# Patient Record
Sex: MALE
[De-identification: names, ages, dates, MRNs, and addresses within clinical notes are randomized per-mention and may not be internally consistent; named-entity substitution may affect disease eponyms.]

---

## 2018-11-05 ENCOUNTER — HOSPITAL ENCOUNTER (OUTPATIENT)
Dept: HOSPITAL 97 - 4TH | Age: 48
Setting detail: OBSERVATION
LOS: 1 days | Discharge: HOME | End: 2018-11-06
Attending: FAMILY MEDICINE | Admitting: FAMILY MEDICINE
Payer: COMMERCIAL

## 2018-11-05 VITALS — BODY MASS INDEX: 35.2 KG/M2

## 2018-11-05 DIAGNOSIS — N20.0: Primary | ICD-10-CM

## 2018-11-05 DIAGNOSIS — G40.909: ICD-10-CM

## 2018-11-05 DIAGNOSIS — R03.0: ICD-10-CM

## 2018-11-05 DIAGNOSIS — Z87.442: ICD-10-CM

## 2018-11-05 LAB
ALBUMIN SERPL BCP-MCNC: 3.9 G/DL (ref 3.4–5)
ALP SERPL-CCNC: 73 U/L (ref 45–117)
ALT SERPL W P-5'-P-CCNC: 30 U/L (ref 12–78)
AST SERPL W P-5'-P-CCNC: 13 U/L (ref 15–37)
BUN BLD-MCNC: 19 MG/DL (ref 7–18)
GLUCOSE SERPLBLD-MCNC: 109 MG/DL (ref 74–106)
HCT VFR BLD CALC: 43.4 % (ref 39.6–49)
INR BLD: 1.07
LYMPHOCYTES # SPEC AUTO: 1.6 K/UL (ref 0.7–4.9)
MAGNESIUM SERPL-MCNC: 2.1 MG/DL (ref 1.8–2.4)
MCH RBC QN AUTO: 29.1 PG (ref 27–35)
MCV RBC: 84.6 FL (ref 80–100)
PMV BLD: 9.7 FL (ref 7.6–11.3)
POTASSIUM SERPL-SCNC: 4 MMOL/L (ref 3.5–5.1)
RBC # BLD: 5.13 M/UL (ref 4.33–5.43)
UA COMPLETE W REFLEX CULTURE PNL UR: (no result)
UA DIPSTICK W REFLEX MICRO PNL UR: (no result)

## 2018-11-05 PROCEDURE — 85025 COMPLETE CBC W/AUTO DIFF WBC: CPT

## 2018-11-05 PROCEDURE — 81001 URINALYSIS AUTO W/SCOPE: CPT

## 2018-11-05 PROCEDURE — 36415 COLL VENOUS BLD VENIPUNCTURE: CPT

## 2018-11-05 PROCEDURE — 81003 URINALYSIS AUTO W/O SCOPE: CPT

## 2018-11-05 PROCEDURE — 85610 PROTHROMBIN TIME: CPT

## 2018-11-05 PROCEDURE — 85730 THROMBOPLASTIN TIME PARTIAL: CPT

## 2018-11-05 PROCEDURE — 81015 MICROSCOPIC EXAM OF URINE: CPT

## 2018-11-05 PROCEDURE — 83735 ASSAY OF MAGNESIUM: CPT

## 2018-11-05 PROCEDURE — 50590 FRAGMENTING OF KIDNEY STONE: CPT

## 2018-11-05 PROCEDURE — 80053 COMPREHEN METABOLIC PANEL: CPT

## 2018-11-05 RX ADMIN — Medication SCH ML: at 19:59

## 2018-11-05 RX ADMIN — MORPHINE SULFATE PRN MG: 2 INJECTION, SOLUTION INTRAMUSCULAR; INTRAVENOUS at 18:19

## 2018-11-05 RX ADMIN — DEXTROSE, SODIUM CHLORIDE, SODIUM LACTATE, POTASSIUM CHLORIDE, AND CALCIUM CHLORIDE SCH MLS: 5; .6; .31; .03; .02 INJECTION, SOLUTION INTRAVENOUS at 18:18

## 2018-11-05 RX ADMIN — HYDROCODONE BITARTRATE AND ACETAMINOPHEN PRN TAB: 7.5; 325 TABLET ORAL at 19:58

## 2018-11-05 RX ADMIN — ONDANSETRON PRN MG: 2 INJECTION INTRAMUSCULAR; INTRAVENOUS at 20:26

## 2018-11-05 NOTE — XMS REPORT
Clinical Summary

 Created on:2018



Patient:Valdo Santos

Sex:Male

:1970

External Reference #:[id number]





Demographics







 Address  [address] [place] [address]

 

 Mobile Phone  [phone]

 

 Home Phone  [phone]

 

 Email Address  [email or web address]

 

 Preferred Language  English

 

 Marital Status  

 

 Restoration Affiliation  Unknown

 

 Race  Unknown

 

 Ethnic Group  Not  or 









Author







 Organization  [facility]

 

 Address  [address] [place] [address]









Support







 Name  Relationship  Address  Phone

 

 No,Contact  Unavailable  Unavailable  [phone]









Care Team Providers







 Name  Role  Phone

 

 Asked, No Pcp  Primary Care Provider  Unavailable









Allergies

No Known Allergies



Current Medications







 Prescription  Sig.  Disp.  Refills  Start Date  End Date  Status

 

 topiramate  TAKE 1 TABLET  270 tablet  1  2018    Active



 (TOPAMAX) 100 MG  BY MOUTH          



 tablet  EVERY MORNING          



   AND TAKE 2          



   TABLETS BY          



   MOUTH EVERY          



   NIGHT AT          



   BEDTIME          

 

 topiramate  Take 3  270 tablet  1  10/20/2017  2017  Discontinued



 (TOPAMAX) 100 MG  tablets (300          



 tablet  mg total) by          



   mouth daily          



   for 180 days.          



   100 mg po qam          



   and 200 mg po          



   qhs.          

 

 topiramate  Take 3  270 tablet  0  2018  Discontinued



 (TOPAMAX) 100 MG  tablets (300          



 tablet  mg total) by          



   mouth daily          



   for 90 days.          



   100 mg po qam          



   and 200 mg po          



   qhs          

 

 topiramate  Take 3  270 tablet  0  2018  Discontinued



 (TOPAMAX) 100 MG  tablets (300          



 tablet  mg total) by          



   mouth daily          



   for 90 days.          



   100 mg po qam          



   and 200 mg po          



   qhs          







Active Problems

Not on file



Encounters







 Date  Type  Specialty  Care Team  Description

 

 2018  Refill  Neurology  Pérez Martinez MD  

 

 2018  Office Visit  Neurology  Pérez Martinez MD  Convulsions, unspecified 
convulsion



         type (Primary Dx)

 

 2017  Refill  Neurology  Pérez Martinez MD  



after 2017



Family History







 Medical History  Relation  Name  Comments

 

 Diabetes  Father    

 

 No Known Problems  Mother    









 Relation  Name  Status  Comments

 

 Father      

 

 Mother      







Social History







 Tobacco Use  Types  Packs/Day  Years Used  Date

 

 Never Smoker        









 Smokeless Tobacco: Never Used      









 Alcohol Use  Drinks/Week  oz/Week  Comments

 

 No      









 Sex Assigned at Birth  Date Recorded

 

 Not on file  







Last Filed Vital Signs







 Vital Sign  Reading  Time Taken

 

 Blood Pressure  142/74  2018  3:44 PM CDT

 

 Pulse  76  2018  3:44 PM CDT

 

 Temperature  -  -

 

 Respiratory Rate  -  -

 

 Oxygen Saturation  -  -

 

 Inhaled Oxygen Concentration  -  -

 

 Weight  121 kg (267 lb 4.8 oz)  2018  3:44 PM CDT

 

 Height  177.8 cm (5' 10")  2018  3:44 PM CDT

 

 Body Mass Index  38.35  2018  3:44 PM CDT







Plan of Treatment







 Date  Type  Specialty  Care Team  Description

 

 2019  Office Visit  Neurology  Pérez Martinez MD



  



       [address] [facility] [address]



  



       [place] [address] [phone] (Fax)  









 Health Maintenance  Due Date  Last Done  Comments

 

 INFLUENZA VACCINE  2018    







Results

Not on fileafter 2017



Insurance







 Payer  Benefit Plan / Group  Subscriber ID  Type  Phone  Address

 

 BCBS  BCBS CHOICE PPO/FEDERAL EMPL PPO  xxxxxxxxxxxx  PPO    









 Guarantor Name  Account Type  Relation to  Date of  Phone  Billing



     Patient  Birth    Address

 

 SANTOSVALDO BYRNES  Personal/Family  Self  1970  Home:  [facility]







         [phone]  [place] [phone]

## 2018-11-05 NOTE — P.HP
Certification for Inpatient


Patient admitted to: Observation


With expected LOS: <2 Midnights


Patient will require the following post-hospital care: None


Practitioner: I am a practitioner with admitting privileges, knowledge of 

patient current condition, hospital course, and medical plan of care.


Services: Services provided to patient in accordance with Admission 

requirements found in Title 42 Section 412.3 of the Code of Federal Regulations





Patient History


Date of Service: 11/05/18


Primary Care Provider: Mony Holden; Urology-Dr. Petersen; Neurology-Dr. Mata


Reason for admission: Left flank pain


History of Present Illness: 





48-year-old  male presented to the hospital as a direct admission.





Patient presents with left flank pain. It started last night.  Pain comes in 

waves.  He rates the pain 10/10 at times.  Currently around 6/10.  He reports 

some nausea and no vomiting.  Denies any significant fever.  No hematuria 

noted. Patient was seen earlier in the day by urology.  Urology ordered x-ray 

showing left renal stone.  Urology recommended the patient to be admitted for 

intervention tomorrow.





Patient with history of kidney stones, seizure disorder and borderline 

hypertension.  He has had lithotripsy done in the past.  Patient denies any 

smoking or alcohol.





When I saw the patient in the room he reporting pain. He appears stable.  

Patient does not  have any respiratory distress.


Allergies





phenytoin sodium [From Dilantin] Allergy (Intermediate, Verified 11/30/12 06:28)


 Rash


phenytoin sodium extended [From Dilantin] Allergy (Intermediate, Verified 11/30/ 12 06:28)


 Rash





Home medications list reviewed: Yes





- Past Medical/Surgical History


Diabetic: No


-: Seizure disorder


-: Borderline hypertension


-: Nephrolithiasis


-: Lithotripsy


-: Back surgery


Psychosocial/ Personal History: The patient is .  He has 1 child.  He is 

a .





- Family History


  ** Father


-: Diabetes





- Social History


Smoking Status: Never smoker


Alcohol use: No


CD- Drugs: No


Caffeine use: Yes


Place of Residence: Home





Review of Systems


General: As per HPI


Eyes: Unremarkable


ENT: Unremarkable


Respiratory: Unremarkable


Cardiovascular: Unremarkable


Gastrointestinal: Nausea, Abdominal Pain, As per HPI


Genitourinary: As per HPI


Musculoskeletal: Back Pain, As per HPI


Integumentary: Unremarkable


Neurological: Unremarkable


Lymphatics: Unremarkable





Physical Examination





- Physical Exam


General: Alert, In no apparent distress, Oriented x3, Cooperative


HEENT: Atraumatic, Normocephalic, PERRLA, Mucous membr. moist/pink


Neck: Supple, No Thyromegaly


Respiratory: Clear to auscultation bilaterally, Normal air movement


Cardiovascular: Normal pulses, Regular rate/rhythm


Gastrointestinal: Normal bowel sounds, Soft and benign, Non-distended, No masses

, No rebound, No guarding, Tenderness (Left flank pain noted)


Musculoskeletal: No contractures, No erythema, No tenderness, No warmth


Integumentary: No tenderness/swelling, No erythema, No warmth, No cyanosis


Neurological: Normal speech, Normal strength at 5/5 x4 extr, Normal tone, 

Normal affect





Assessment and Plan





- Plan





Impression:


Left flank pain, nausea secondary to left nephrolithiasis with history of 

nephrolithiasis


Seizure disorder


History of borderline hypertension





Plan:


Left flank pain, nausea secondary to left nephrolithiasis with history of 

nephrolithiasis:  Patient was a direct admit from neurology.  Patient will be 

provided medication for pain. Will obtain CBC, BMP and urinalysis.  Will start 

Flomax.  Patient will remain NPO after midnight.  Cystoscopy with possible 

further urological intervention planned for tomorrow.  Await further 

recommendations from urology.


Seizure disorder:  Will resume home medication of Topamax 100 mg every a.m. and 

200 mg every bedtime.  Patient is seen by neurology in [place].


History of borderline hypertension:  Patient reports history of hypertension.  

Will monitor this closely.  Will provide medication as needed for elevated 

blood pressure


Discharge Plan: Home


Plan to discharge in: 24 Hours





- Advance Directives


Does patient have a Living Will: No


Does patient have a Durable POA for Healthcare: No





- Code Status/Comfort Care


Code Status Assessed: Yes (Patient is full code.)


Time Spent Managing Pts Care (In Minutes): 55

## 2018-11-06 VITALS — TEMPERATURE: 97.1 F | SYSTOLIC BLOOD PRESSURE: 131 MMHG | DIASTOLIC BLOOD PRESSURE: 73 MMHG

## 2018-11-06 VITALS — OXYGEN SATURATION: 97 %

## 2018-11-06 PROCEDURE — 0TF4XZZ FRAGMENTATION IN LEFT KIDNEY PELVIS, EXTERNAL APPROACH: ICD-10-PCS

## 2018-11-06 RX ADMIN — HYDROCODONE BITARTRATE AND ACETAMINOPHEN PRN TAB: 7.5; 325 TABLET ORAL at 04:28

## 2018-11-06 RX ADMIN — ONDANSETRON PRN MG: 2 INJECTION INTRAMUSCULAR; INTRAVENOUS at 06:02

## 2018-11-06 RX ADMIN — DEXTROSE, SODIUM CHLORIDE, SODIUM LACTATE, POTASSIUM CHLORIDE, AND CALCIUM CHLORIDE SCH MLS: 5; .6; .31; .03; .02 INJECTION, SOLUTION INTRAVENOUS at 04:29

## 2018-11-06 RX ADMIN — Medication SCH ML: at 08:07

## 2018-11-06 RX ADMIN — MORPHINE SULFATE PRN MG: 2 INJECTION, SOLUTION INTRAMUSCULAR; INTRAVENOUS at 00:16

## 2018-11-06 RX ADMIN — MORPHINE SULFATE PRN MG: 2 INJECTION, SOLUTION INTRAMUSCULAR; INTRAVENOUS at 05:58

## 2018-11-06 NOTE — P.DS
Admission Date: 11/05/18


Discharge Date: 11/06/18


Primary Care Provider: Mony Holden; Urology-Dr. Petersen; Neurology-Dr. Mata


Disposition: ROUTINE DISCHARGE


Discharge Condition: GOOD


Reason for Admission: Left flank pain


Consultations: 





Urology-Dr. Petersen


Procedures: 





KUB:  Calcification to the left pole





Urology:


Cystoscopy, ureteroscopy, extra corporal shockwave lithotripsy





Medical problem list:


Left flank pain, nausea secondary to left nephrolithiasis with renal 

insufficiency with history of nephrolithiasis status post cystoscopy, 

ureteroscopy, extra corporal shockwave lithotripsy


Seizure disorder


Hypertension





Brief History of Present Illness: 





48-year-old  male presented to the hospital as a direct admission.





Patient presents with left flank pain. It started last night.  Pain comes in 

waves.  He rates the pain 10/10 at times.  Currently around 6/10.  He reports 

some nausea and no vomiting.  Denies any significant fever.  No hematuria 

noted. Patient was seen earlier in the day by urology.  Urology ordered x-ray 

showing left renal stone.  Urology recommended the patient to be admitted for 

intervention tomorrow.





Patient with history of kidney stones, seizure disorder and borderline 

hypertension.  He has had lithotripsy done in the past.  Patient denies any 

smoking or alcohol.





When I saw the patient in the room he reporting pain. He appears stable.  

Patient does not  have any respiratory distress.


Hospital Course: 





Patient presented with left flank pain, nausea secondary to left 

nephrolithiasis with renal insufficiency.  Patient with history of 

nephrolithiasis.  Patient seen by Urology.  Cystoscopy, ureteroscopy, extra 

corporal shock wave lithotripsy done.  Patient tolerated procedure well.  At 

discharge patient will follow up with urology in 1 week to follow up this 

hospitalization.  Patient given medication by urology for pain. At discharge, 

he will continue with Flomax 0.4 mg daily. Recommendation to recheck lab-BMP in 

1 week to monitor his progress.





Patient has seizure disorder.  Patient will resume his home medication of 

Topamax 100 mg every a.m. and 200 mg every bedtime.  Patient will follow up 

with neurology as directed.





Patient with hypertension.  Recommendation to maintain blood pressures less 150/

80.  Further adjustment can be done by his PCP.  Patient may continue with his 

medication-Norvasc 5 mg daily.


Vital Signs/Physical Exam: 














Temp Pulse Resp BP Pulse Ox


 


 97.3 F   65   18   138/83   97 


 


 11/06/18 12:00  11/06/18 12:00  11/06/18 12:00  11/06/18 12:00  11/06/18 12:00








General: Alert, In no apparent distress, Oriented x3, Cooperative


HEENT: Atraumatic


Neck: Supple


Respiratory: Clear to auscultation bilaterally, Normal air movement


Cardiovascular: Normal pulses, Regular rate/rhythm


Gastrointestinal: Normal bowel sounds, Soft and benign, Non-distended, No 

tenderness, No masses, No rebound, No guarding


Musculoskeletal: No erythema, No tenderness, No warmth


Integumentary: No tenderness/swelling, No erythema, No warmth, No cyanosis


Neurological: Normal speech, Normal strength at 5/5 x4 extr, Normal tone, 

Normal affect


Laboratory Data at Discharge: 














WBC  12.0 K/uL (4.3-10.9)  H  11/05/18  17:58    


 


Hgb  14.9 g/dL (13.6-17.9)   11/05/18  17:58    


 


Hct  43.4 % (39.6-49.0)   11/05/18  17:58    


 


Plt Count  153 K/uL (152-406)   11/05/18  17:58    


 


PT  12.6 SECONDS (9.5-12.5)  H  11/05/18  17:58    


 


INR  1.07   11/05/18  17:58    


 


APTT  32.0 SECONDS (24.3-36.9)   11/05/18  17:58    


 


Sodium  142 mmol/L (136-145)   11/05/18  17:58    


 


Potassium  4.0 mmol/L (3.5-5.1)   11/05/18  17:58    


 


BUN  19 mg/dL (7-18)  H  11/05/18  17:58    


 


Creatinine  1.70 mg/dL (0.55-1.3)  H  11/05/18  17:58    


 


Glucose  109 mg/dL ()  H  11/05/18  17:58    


 


Magnesium  2.1 mg/dL (1.8-2.4)   11/05/18  17:58    


 


Total Bilirubin  0.4 mg/dL (0.2-1.0)   11/05/18  17:58    


 


AST  13 U/L (15-37)  L  11/05/18  17:58    


 


ALT  30 U/L (12-78)   11/05/18  17:58    


 


Alkaline Phosphatase  73 U/L ()   11/05/18  17:58    








Home Medications: 








Amlodipine Besylate 5 mg PO DAILY 11/05/18 


Topiramate 100 mg PO DAILY 11/05/18 


Topiramate 200 mg PO BEDTIME 11/05/18 


Tamsulosin [Flomax*] 0.4 mg PO BEDTIME #30 cap 11/06/18 





New Medications: 


Tamsulosin [Flomax*] 0.4 mg PO BEDTIME #30 cap


Patient Discharge Instructions: 1.  Patient will need to follow up with his PCP 

in 1 week to follow up this hospitalization.  2.  Patient presented with left 

flank pain, nausea secondary to left nephrolithiasis with renal insufficiency.  

Patient with history of nephrolithiasis.  Patient seen by Urology.  Cystoscopy, 

ureteroscopy, extra corporal shock wave lithotripsy done.  Patient tolerated 

procedure well.  At discharge patient will follow up with urology in 1 week to 

follow up this hospitalization.  Patient given medication by urology for pain. 

At discharge, he will continue with Flomax 0.4 mg daily. Recommendation to 

recheck lab-BMP in 1 week to monitor his progress.  3.  Patient has seizure 

disorder.  Patient will resume his home medication of Topamax 100 mg every a.m. 

and 200 mg every bedtime.  Patient will follow up with neurology as directed.  

4.  Patient with hypertension.  Recommendation to maintain blood pressures less 

150/80.  Further adjustment can be done by his PCP.  Patient may continue with 

his medication-Norvasc 5 mg daily.


Diet: AHA


Activity: Ad lorenzo


Followup: 


Noam Petersen MD [ACTIVE - CAN ADMIT] - 


Time spent managing pt's care (in minutes): 55

## 2018-11-09 ENCOUNTER — HOSPITAL ENCOUNTER (EMERGENCY)
Dept: HOSPITAL 97 - ER | Age: 48
Discharge: HOME | End: 2018-11-09
Payer: COMMERCIAL

## 2018-11-09 VITALS — SYSTOLIC BLOOD PRESSURE: 144 MMHG | OXYGEN SATURATION: 100 % | DIASTOLIC BLOOD PRESSURE: 90 MMHG

## 2018-11-09 VITALS — TEMPERATURE: 97.6 F

## 2018-11-09 DIAGNOSIS — Z88.8: ICD-10-CM

## 2018-11-09 DIAGNOSIS — I10: ICD-10-CM

## 2018-11-09 DIAGNOSIS — M79.602: Primary | ICD-10-CM

## 2018-11-09 DIAGNOSIS — G40.909: ICD-10-CM

## 2018-11-09 LAB
ALBUMIN SERPL BCP-MCNC: 4.1 G/DL (ref 3.4–5)
ALP SERPL-CCNC: 70 U/L (ref 45–117)
ALT SERPL W P-5'-P-CCNC: 30 U/L (ref 12–78)
AST SERPL W P-5'-P-CCNC: 13 U/L (ref 15–37)
BUN BLD-MCNC: 14 MG/DL (ref 7–18)
GLUCOSE SERPLBLD-MCNC: 88 MG/DL (ref 74–106)
HCT VFR BLD CALC: 47.7 % (ref 39.6–49)
INR BLD: 1.03
LYMPHOCYTES # SPEC AUTO: 2.3 K/UL (ref 0.7–4.9)
MAGNESIUM SERPL-MCNC: 2 MG/DL (ref 1.8–2.4)
MCH RBC QN AUTO: 28.8 PG (ref 27–35)
MCV RBC: 86.7 FL (ref 80–100)
NT-PROBNP SERPL-MCNC: 103 PG/ML (ref ?–125)
PMV BLD: 9.5 FL (ref 7.6–11.3)
POTASSIUM SERPL-SCNC: 3.6 MMOL/L (ref 3.5–5.1)
RBC # BLD: 5.5 M/UL (ref 4.33–5.43)
TROPONIN (EMERG DEPT USE ONLY): < 0.02 NG/ML (ref 0–0.04)

## 2018-11-09 PROCEDURE — 80048 BASIC METABOLIC PNL TOTAL CA: CPT

## 2018-11-09 PROCEDURE — 93005 ELECTROCARDIOGRAM TRACING: CPT

## 2018-11-09 PROCEDURE — 80076 HEPATIC FUNCTION PANEL: CPT

## 2018-11-09 PROCEDURE — 83735 ASSAY OF MAGNESIUM: CPT

## 2018-11-09 PROCEDURE — 83880 ASSAY OF NATRIURETIC PEPTIDE: CPT

## 2018-11-09 PROCEDURE — 36415 COLL VENOUS BLD VENIPUNCTURE: CPT

## 2018-11-09 PROCEDURE — 99284 EMERGENCY DEPT VISIT MOD MDM: CPT

## 2018-11-09 PROCEDURE — 93971 EXTREMITY STUDY: CPT

## 2018-11-09 PROCEDURE — 85610 PROTHROMBIN TIME: CPT

## 2018-11-09 PROCEDURE — 84484 ASSAY OF TROPONIN QUANT: CPT

## 2018-11-09 PROCEDURE — 71045 X-RAY EXAM CHEST 1 VIEW: CPT

## 2018-11-09 PROCEDURE — 85025 COMPLETE CBC W/AUTO DIFF WBC: CPT

## 2018-11-09 NOTE — RAD REPORT
EXAM DESCRIPTION:  RAD - Chest Single View - 11/9/2018 5:41 pm

 

CLINICAL HISTORY:  Left arm pain

 

COMPARISON:  November 5

 

TECHNIQUE:  AP portable chest image was obtained 1729 hours .

 

FINDINGS:  Lungs are clear. Heart and vasculature are normal. No measurable pleural effusion and no p
neumothorax. No acute bony abnormality seen. No acute aortic findings suspected.

 

IMPRESSION:  No acute cardiopulmonary process.

 

No significant interval change.

## 2018-11-09 NOTE — XMS REPORT
Clinical Summary

 Created on:2018



Patient:Valdo Santos

Sex:Male

:1970

External Reference #:[id number]





Demographics







 Address  [address] [place] [address]

 

 Mobile Phone  [phone]

 

 Home Phone  [phone]

 

 Email Address  [email or web address]

 

 Preferred Language  English

 

 Marital Status  

 

 Spiritism Affiliation  Unknown

 

 Race  Unknown

 

 Ethnic Group  Not  or 









Author







 Organization  [facility]

 

 Address  [address] [place] [address]









Support







 Name  Relationship  Address  Phone

 

 No,Contact  Unavailable  Unavailable  [phone]









Care Team Providers







 Name  Role  Phone

 

 Asked, No Pcp  Primary Care Provider  Unavailable









Allergies

No Known Allergies



Current Medications







 Prescription  Sig.  Disp.  Refills  Start Date  End Date  Status

 

 topiramate  TAKE 1 TABLET  270 tablet  1  2018    Active



 (TOPAMAX) 100 MG  BY MOUTH          



 tablet  EVERY MORNING          



   AND TAKE 2          



   TABLETS BY          



   MOUTH EVERY          



   NIGHT AT          



   BEDTIME          

 

 topiramate  Take 3  270 tablet  1  10/20/2017  2017  Discontinued



 (TOPAMAX) 100 MG  tablets (300          



 tablet  mg total) by          



   mouth daily          



   for 180 days.          



   100 mg po qam          



   and 200 mg po          



   qhs.          

 

 topiramate  Take 3  270 tablet  0  2018  Discontinued



 (TOPAMAX) 100 MG  tablets (300          



 tablet  mg total) by          



   mouth daily          



   for 90 days.          



   100 mg po qam          



   and 200 mg po          



   qhs          

 

 topiramate  Take 3  270 tablet  0  2018  Discontinued



 (TOPAMAX) 100 MG  tablets (300          



 tablet  mg total) by          



   mouth daily          



   for 90 days.          



   100 mg po qam          



   and 200 mg po          



   qhs          







Active Problems

Not on file



Encounters







 Date  Type  Specialty  Care Team  Description

 

 2018  Refill  Neurology  Pérez Martinez MD  

 

 2018  Office Visit  Neurology  Pérez Martinez MD  Convulsions, unspecified 
convulsion



         type (Primary Dx)

 

 2017  Refill  Neurology  Pérez Martinez MD  



after 2017



Family History







 Medical History  Relation  Name  Comments

 

 Diabetes  Father    

 

 No Known Problems  Mother    









 Relation  Name  Status  Comments

 

 Father      

 

 Mother      







Social History







 Tobacco Use  Types  Packs/Day  Years Used  Date

 

 Never Smoker        









 Smokeless Tobacco: Never Used      









 Alcohol Use  Drinks/Week  oz/Week  Comments

 

 No      









 Sex Assigned at Birth  Date Recorded

 

 Not on file  







Last Filed Vital Signs







 Vital Sign  Reading  Time Taken

 

 Blood Pressure  142/74  2018  3:44 PM CDT

 

 Pulse  76  2018  3:44 PM CDT

 

 Temperature  -  -

 

 Respiratory Rate  -  -

 

 Oxygen Saturation  -  -

 

 Inhaled Oxygen Concentration  -  -

 

 Weight  121 kg (267 lb 4.8 oz)  2018  3:44 PM CDT

 

 Height  177.8 cm (5' 10")  2018  3:44 PM CDT

 

 Body Mass Index  38.35  2018  3:44 PM CDT







Plan of Treatment







 Date  Type  Specialty  Care Team  Description

 

 2019  Office Visit  Neurology  Pérez Martinez MD



  



       [address] [facility] [address]



  



       [place] [address] [phone] (Fax)  









 Health Maintenance  Due Date  Last Done  Comments

 

 INFLUENZA VACCINE  2018    







Results

Not on fileafter 2017



Insurance







 Payer  Benefit Plan / Group  Subscriber ID  Type  Phone  Address

 

 BCBS  BCBS CHOICE PPO/FEDERAL EMPL PPO  xxxxxxxxxxxx  PPO    









 Guarantor Name  Account Type  Relation to  Date of  Phone  Billing



     Patient  Birth    Address

 

 SANTOSVALDO BYRNES  Personal/Family  Self  1970  Home:  [facility]







         [phone]  [place] [phone]

## 2018-11-09 NOTE — ER
Nurse's Notes                                                                                     

 [facility]                                                                

Name: Zhen Santos                                                                               

Age: 48 yrs                                                                                       

Sex: Male                                                                                         

: 1970                                                                                   

MRN: [id number]                                                                                   

Arrival Date: 2018                                                                          

Time: 14:09                                                                                       

Account#: [id number]                                                                            

Bed 23                                                                                            

Private MD: None, None                                                                            

Diagnosis: Pain in left arm                                                                       

                                                                                                  

Presentation:                                                                                     

                                                                                             

14:25 Presenting complaint: Patient states: Left arm pain that started Wednesday night.       aj  

      Patient had lithotripsy on Tuesday and reports IV site infiltration to left forearm.        

      Patient reports pain at side. Denies N/V SOB or diaphoresis. Transition of care:            

      patient was not received from another setting of care. Onset of symptoms was 2018. Risk Assessment: Do you want to hurt yourself or someone else? Patient            

      reports no desire to harm self or others. Initial Sepsis Screen: Does the patient meet      

      any 2 criteria? No. Patient's initial sepsis screen is negative. Does the patient have      

      a suspected source of infection? No. Patient's initial sepsis screen is negative. Care      

      prior to arrival: None.                                                                     

14:25 Method Of Arrival: Ambulatory                                                             

14:25 Acuity: THO 3                                                                             

                                                                                                  

Triage Assessment:                                                                                

14:29 General: Appears in no apparent distress. comfortable, Behavior is calm, cooperative,   aj  

      appropriate for age. Pain: Complains of pain in left arm. Neuro: Level of Consciousness     

      is awake, alert, obeys commands, Oriented to person, place, time, situation,                

      Appropriate for age. Cardiovascular: Denies diaphoresis, fatigue, lightheadedness,          

      nausea, palpitations, shortness of breath, syncope, vomiting. Respiratory: Airway is        

      patent Respiratory effort is even, unlabored, Respiratory pattern is regular,               

      symmetrical. Derm: Skin is intact, is healthy with good turgor, Skin is pink, warm \T\      

      dry. normal, Bruising that is brown, on dorsal aspect of left forearm. Musculoskeletal:     

      Reports pain in left arm.                                                                   

                                                                                                  

Historical:                                                                                       

- Allergies:                                                                                      

14:29 Dilantin;                                                                               aj  

- Home Meds:                                                                                      

14:29 Topamax Oral [Active]; alfuzosin 10 mg oral Tb24 1 tab once daily [Active]; amlodipine  aj  

      oral [Active];                                                                              

- PMHx:                                                                                           

14:29 Hypertension; Seizures; Prostate Issues; Kidney stones;                                 aj  

- PSHx:                                                                                           

14:29 Lithotripsy;                                                                            aj  

                                                                                                  

- Immunization history:: Adult Immunizations up to date.                                          

- Social history:: Smoking status: Patient/guardian denies using tobacco.                         

- Ebola Screening: : Patient negative for fever greater than or equal to 101.5 degrees            

  Fahrenheit, and additional compatible Ebola Virus Disease symptoms Patient denies               

  exposure to infectious person Patient denies travel to an Ebola-affected area in the            

  21 days before illness onset No symptoms or risks identified at this time.                      

                                                                                                  

                                                                                                  

Screenin:00 Abuse screen: Denies threats or abuse. Nutritional screening: No deficits noted.        tl3 

      Tuberculosis screening: No symptoms or risk factors identified. Fall Risk None              

      identified.                                                                                 

                                                                                                  

Assessment:                                                                                       

14:45 General: Appears uncomfortable, well groomed, well developed, well nourished. Pain:     tl3 

      Complains of pain in dorsal aspect of left forearm and left arm. Neuro: Level of            

      Consciousness is awake, alert, obeys commands, Oriented to person, place, time,             

      situation, Appropriate for age. Cardiovascular: Patient's skin is warm and dry.             

      Respiratory: Airway is patent Respiratory effort is even, unlabored, Respiratory            

      pattern is regular, symmetrical. GI: No signs and/or symptoms were reported involving       

      the gastrointestinal system. : No signs and/or symptoms were reported regarding the       

      genitourinary system. EENT: No signs and/or symptoms were reported regarding the EENT       

      system. Derm: No signs and/or symptoms reported regarding the dermatologic system.          

17:00 Reassessment: No changes from previously documented assessment. Patient and/or family   tl3 

      updated on plan of care and expected duration. Pain level reassessed. Patient is alert,     

      oriented x 3, equal unlabored respirations, skin warm/dry/pink.                             

                                                                                                  

Vital Signs:                                                                                      

14:29  / 89; Pulse 74; Resp 20; Temp 97.6; Pulse Ox 99% on R/A; Weight 114.76 kg;       aj  

      Height 5 ft. 10 in. (177.80 cm);                                                            

14:45  / 85; Pulse 53; Resp 18; Pulse Ox 97% ;                                          tl3 

17:00  / 90; Pulse 53; Resp 18; Pulse Ox 100% ;                                         tl3 

14:29 Body Mass Index 36.30 (114.76 kg, 177.80 cm)                                            aj  

                                                                                                  

ED Course:                                                                                        

14:09 Patient arrived in ED.                                                                  mr  

14:09 None, None is Private Physician.                                                        mr  

14:27 Triage completed.                                                                       aj  

14:29 Arm band placed on right wrist. Patient placed in waiting room, Patient notified of       

      wait time.                                                                                  

15:10 EKG done, by EKG tech. reviewed by Ray Eduardo MD.                                   3 

16:53 Ray Turcios PA is PHCP.                                                                cp  

16:53 Ray Eduardo MD is Attending Physician.                                             cp  

17:00 Patient has correct armband on for positive identification.                             tl3 

17:00 No provider procedures requiring assistance completed.                                  tl3 

17:00 IV discontinued, intact, bleeding controlled, No redness/swelling at site. Pressure     tl3 

      dressing applied.                                                                           

17:25 Kianna Gandara, RN is Primary Nurse.                                                     tl3 

17:41 XRAY Chest (1 view) In Process Unspecified.                                             EDMS

18:00 US Extremity Venous Unilateral Ltd Sent.                                                tl3 

18:18 Initial lab(s) drawn, by me, sent to lab. Inserted saline lock: 20 gauge in right       dh3 

      antecubital area, using aseptic technique. Blood collected.                                 

18:31 Ultrasound completed. Patient tolerated well.                                           sg3 

18:49 US Extremity Venous Unilateral Ltd In Process Unspecified.                              EDMS

                                                                                                  

Administered Medications:                                                                         

17:30 Drug: Aspirin Chewable Tablet 324 mg Route: PO;                                         tl3 

19:56 Follow up: Response: No adverse reaction                                                tl3 

                                                                                                  

                                                                                                  

Outcome:                                                                                          

17:00 Discharged to home ambulatory.                                                          tl3 

17:00 Condition: stable                                                                           

17:00 Discharge instructions given to patient, Instructed on discharge instructions, follow       

      up and referral plans. Demonstrated understanding of instructions, follow-up care,          

      medications, Prescriptions given X 2.                                                       

19:22 Discharge ordered by MD.                                                                cp  

19:58 Patient left the ED.                                                                    tl3 

                                                                                                  

Signatures:                                                                                       

Dispatcher [employer]                           EDMS                                                 

Leslee Javier, RN                       [employer] Steele                                 mr                                                   

Ray Turcios PA PA cp Herrera, Deanna                              3                                                  

Radha Jones                               3                                                  

Kianna Gandara, RN                       RN   3                                                  

Augustina Osuna                              3                                                  

                                                                                                  

**************************************************************************************************

## 2018-11-09 NOTE — EKG
Test Date:    2018-11-09               Test Time:    14:41:07

Technician:   DOROTHY                                     

                                                     

MEASUREMENT RESULTS:                                       

Intervals:                                           

Rate:         58                                     

KY:           158                                    

QRSD:         98                                     

QT:           434                                    

QTc:          426                                    

Axis:                                                

P:            45                                     

KY:           158                                    

QRS:          44                                     

T:            45                                     

                                                     

INTERPRETIVE STATEMENTS:                                       

                                                     

Sinus bradycardia

Otherwise normal ECG

No previous ECG available for comparison



Electronically Signed On 11-09-18 18:44:45 CST by Isaiah Moya

## 2018-11-09 NOTE — EDPHYS
Physician Documentation                                                                           

 [facility]                                                                

Name: Zhen Santos                                                                               

Age: 48 yrs                                                                                       

Sex: Male                                                                                         

: 1970                                                                                   

MRN: [id number]                                                                                   

Arrival Date: 2018                                                                          

Time: 14:09                                                                                       

Account#: [id number]                                                                            

Bed 23                                                                                            

Private MD: None, None                                                                            

ED Physician Ray Eduardo                                                                      

HPI:                                                                                              

                                                                                             

17:10 This 48 yrs old  Male presents to ER via Ambulatory with complaints of Arm      cp  

      Pain, Shoulder Pain.                                                                        

17:10 The patient or guardian complains of pain, that is acute. The complaints affect the     cp  

      left arm. Onset: The symptoms/episode began/occurred 2 day(s) ago.                          

17:10 Context: Patient reports having IV to placed in left arm 2 days ago while in hospital   cp  

      for kidney stone. Pain to left arm started after discharge from hospital. Patient           

      denies injury.                                                                              

                                                                                                  

Historical:                                                                                       

- Allergies:                                                                                      

14:29 Dilantin;                                                                               aj  

- Home Meds:                                                                                      

14:29 Topamax Oral [Active]; alfuzosin 10 mg oral Tb24 1 tab once daily [Active]; amlodipine  aj  

      oral [Active];                                                                              

- PMHx:                                                                                           

14:29 Hypertension; Seizures; Prostate Issues; Kidney stones;                                 aj  

- PSHx:                                                                                           

14:29 Lithotripsy;                                                                            aj  

                                                                                                  

- Immunization history:: Adult Immunizations up to date.                                          

- Social history:: Smoking status: Patient/guardian denies using tobacco.                         

- Ebola Screening: : Patient negative for fever greater than or equal to 101.5 degrees            

  Fahrenheit, and additional compatible Ebola Virus Disease symptoms Patient denies               

  exposure to infectious person Patient denies travel to an Ebola-affected area in the            

  21 days before illness onset No symptoms or risks identified at this time.                      

                                                                                                  

                                                                                                  

ROS:                                                                                              

17:13 Constitutional: Negative for body aches, chills, poor PO intake.                        cp  

17:13 Eyes: Negative for injury, pain, redness, and discharge.                                cp  

17:13 ENT: Negative for drainage from ear(s), ear pain, sore throat, difficulty swallowing,       

      difficulty handling secretions.                                                             

17:13 Cardiovascular: Positive for left upper chest pain, Negative for edema, palpitations.       

17:13 Respiratory: Negative for cough, shortness of breath, wheezing.                             

17:13 Abdomen/GI: Negative for abdominal pain, nausea, vomiting, and diarrhea, constipation,      

      anorexia, black/tarry stool, rectal bleeding.                                               

17:13 Back: Negative for pain at rest, pain with movement, radiated pain.                         

17:13 : Negative for urinary symptoms, flank pain.                                              

17:13 MS/extremity: Positive for pain, tenderness, of the left arm, Negative for injury or        

      acute deformity, decreased range of motion, paresthesias, tingling, warmth.                 

17:13 Skin: Negative for cellulitis, diaphoresis, rash.                                           

17:13 Neuro: Negative for altered mental status, dizziness, headache, syncope, near syncope,      

      weakness.                                                                                   

17:13 All other systems are negative.                                                             

                                                                                                  

Exam:                                                                                             

17:15 ECG was reviewed by the Attending Physician.                                            cp  

17:20 Constitutional: The patient appears in no acute distress, alert, awake,                 cp  

      non-diaphoretic, non-toxic, well developed, well nourished.                                 

17:20 Head/Face:  Normocephalic, atraumatic. Eyes:  Pupils equal round and reactive to light, cp  

      extra-ocular motions intact.  Lids and lashes normal.  Conjunctiva and sclera are           

      non-icteric and not injected.  Cornea within normal limits.  Periorbital areas with no      

      swelling, redness, or edema. ENT:  Nares patent. No nasal discharge, no septal              

      abnormalities noted.  Tympanic membranes are normal and external auditory canals are        

      clear.  Oropharynx with no redness, swelling, or masses, exudates, or evidence of           

      obstruction, uvula midline.  Mucous membranes moist. Neck:  Trachea midline, no             

      thyromegaly or masses palpated, and no cervical lymphadenopathy.  Supple, full range of     

      motion without nuchal rigidity, or vertebral point tenderness.  No Meningismus.             

      Chest/axilla:  Normal chest wall appearance and motion.  Nontender with no deformity.       

      No lesions are appreciated.                                                                 

17:20 Cardiovascular: Rate: bradycardic, Rhythm: regular, Pulses: Pulses are 2+ in right          

      radial artery and left radial artery. Heart sounds: murmur, not appreciated, rub, not       

      appreciated, gallop, not appreciated, Edema: is not appreciated, JVD: is not                

      appreciated.                                                                                

17:20 Respiratory: the patient does not display signs of respiratory distress,  Respirations:     

      normal, no use of accessory muscles, no retractions, no splinting, no tachypnea,            

      labored breathing, is not present, Breath sounds: are clear throughout, no decreased        

      breath sounds, no stridor, no wheezing.                                                     

17:20 Abdomen/GI: Inspection: abdomen appears normal, Bowel sounds: active, all quadrants,        

      Palpation: abdomen is soft and non-tender, in all quadrants, rebound tenderness, is not     

      appreciated, involuntary guarding, is not appreciated.                                      

17:20 Back: pain, is absent, ROM is normal.                                                       

17:20 Musculoskeletal/extremity: Extremities: grossly normal except: noted in the volar side      

      left proximal forearm: tenderness, There is no evidence of  ecchymosis, erythema, rash,     

      ROM: full active range of motion, in the left arm, Perfusion: the extremity is normally     

      perfused throughout, Sensation intact.                                                      

17:20 Skin: cellulitis, is not appreciated, no rash present.                                      

17:20 Neuro: Orientation: to person, place \T\ time. Mentation: is normal, Cerebellar function:   

      is grossly normal, Motor: moves all fours, strength is normal.                              

                                                                                                  

Vital Signs:                                                                                      

14:29  / 89; Pulse 74; Resp 20; Temp 97.6; Pulse Ox 99% on R/A; Weight 114.76 kg;       aj  

      Height 5 ft. 10 in. (177.80 cm);                                                            

14:45  / 85; Pulse 53; Resp 18; Pulse Ox 97% ;                                          tl3 

17:00  / 90; Pulse 53; Resp 18; Pulse Ox 100% ;                                         tl3 

14:29 Body Mass Index 36.30 (114.76 kg, 177.80 cm)                                            aj  

                                                                                                  

MDM:                                                                                              

16:53 Patient medically screened.                                                             cp  

18:00 Differential diagnosis: cellulitis, DVT, angina, strain, tendonitis, phlebitis.         cp  

19:20 Data reviewed: vital signs, nurses notes, lab test result(s), EKG, radiologic studies,  cp  

      plain films.                                                                                

19:20 Test interpretation: by ED physician or midlevel provider: ECG, plain radiologic        cp  

      studies. Counseling: I had a detailed discussion with the patient and/or guardian           

      regarding: the historical points, exam findings, and any diagnostic results supporting      

      the discharge/admit diagnosis, lab results, radiology results, to return to the             

      emergency department if symptoms worsen or persist or if there are any questions or         

      concerns that arise at home. Response to treatment: the patient's symptoms have             

      markedly improved after treatment, VSS. EKG, labs, and radiology studies negative for       

      acute findings. Will discharge to home for continued monitoring.                            

                                                                                                  

                                                                                             

17:07 Order name: Basic Metabolic Panel; Complete Time: 19:16                                 cp  

                                                                                             

19:17 Interpretation: Normal except: ; GFR 80.                                            

                                                                                             

17:07 Order name: CBC with Diff; Complete Time: 19:16                                           

                                                                                             

19:17 Interpretation: Normal except: WBC 8.4; RBC 5.50.                                         

                                                                                             

17:07 Order name: LFT's; Complete Time: 19:16                                                 cp  

                                                                                             

19:17 Interpretation: Normal except: AST 13; GLOB 3.7.                                          

                                                                                             

17:07 Order name: Magnesium; Complete Time: 19:16                                             cp  

                                                                                             

17:07 Order name: NT PRO-BNP; Complete Time: 19:16                                            cp  

                                                                                             

19:17 Interpretation: Within normal limits: NT PRO-.                                     

                                                                                             

17:07 Order name: PT-INR; Complete Time: 19:16                                                  

                                                                                             

17:07 Order name: Troponin (emerg Dept Use Only); Complete Time: 19:16                          

                                                                                             

19:17 Interpretation: TROPED < 0.02; Reviewed.                                                  

                                                                                             

17:07 Order name: XRAY Chest (1 view)                                                           

                                                                                             

17:07 Order name: EKG; Complete Time: 17:08                                                     

                                                                                             

17:07 Order name: Cardiac monitoring; Complete Time: 17:53                                      

                                                                                             

17:07 Order name: EKG - Nurse/Tech; Complete Time: 17:53                                        

                                                                                             

17:07 Order name: IV Saline Lock; Complete Time: 17:53                                        cp  

                                                                                             

17:07 Order name: US Extremity Venous Unilateral Ltd                                            

                                                                                             

17:07 Order name: Labs collected and sent; Complete Time: 17:53                                 

                                                                                             

17:07 Order name: O2 Per Protocol; Complete Time: 17:26                                         

                                                                                             

17:07 Order name: O2 Sat Monitoring; Complete Time: 17:26                                     cp  

                                                                                                  

EC:15 Rate is 58 beats/min. Rhythm is regular. MT interval is normal. QRS interval is normal. cp  

      QT interval is normal. Interpreted by me. Reviewed by me.                                   

                                                                                                  

Administered Medications:                                                                         

17:30 Drug: Aspirin Chewable Tablet 324 mg Route: PO;                                         tl3 

19:56 Follow up: Response: No adverse reaction                                                tl3 

                                                                                                  

                                                                                                  

Disposition:                                                                                      

18 19:22 Discharged to Home. Impression: Pain in left arm.                                  

- Condition is Stable.                                                                            

- Discharge Instructions: Musculoskeletal Pain, Phlebitis, Easy-to-Read.                          

- Prescriptions for Anaprox  mg Oral Tablet - take 1 tablet by ORAL route every             

  12 hours As needed; 20 tablet. Cyclobenzaprine 10 mg Oral Tablet - take 1 tablet by             

  ORAL route every 8 hours As needed; 20 tablet.                                                  

- Medication Reconciliation Form, Thank You Letter, Antibiotic Education, Prescription            

  Opioid Use form.                                                                                

- Follow up: Private Physician; When: 2 - 3 days; Reason: Recheck today's complaints.             

- Problem is new.                                                                                 

- Symptoms have improved.                                                                         

                                                                                                  

                                                                                                  

                                                                                                  

Addendum:                                                                                         

2018                                                                                        

     09:38 Co-signature as Attending Physician, Ray Eduardo MD I agree with the assessment and  c
ha

           plan of care.                                                                          

                                                                                                  

Signatures:                                                                                       

Dispatcher [employer]                           EDLeslee Simpson, RN                       RN   Ray Chavira MD MD cha Page, Corey, PA PA cp Lowrey, Tammy, RN                       RN   tl3                                                  

                                                                                                  

Corrections: (The following items were deleted from the chart)                                    

                                                                                             

19:58 19:22 2018 19:22 Discharged to Home. Impression: Pain in left arm. Condition is   tl3 

      Stable. Forms are Medication Reconciliation Form, Thank You Letter, Antibiotic              

      Education, Prescription Opioid Use. Follow up: Private Physician; When: 2 - 3 days;         

      Reason: Recheck today's complaints. Problem is new. Symptoms have improved. cp              

                                                                                                  

**************************************************************************************************

## 2023-09-19 NOTE — RAD REPORT
EXAM DESCRIPTION:  US - Extremity Venous Uni Ltd - 11/9/2018 6:47 pm

 

CLINICAL HISTORY:  Left arm pain and swelling

 

COMPARISON:  None.

 

TECHNIQUE:  Real-time sonographic evaluation of the left upper extremity deep venous systems was perf
ormed.

 

FINDINGS:  Normal compressibility, flow augmentation, phasic flow and spontaneous flow are identified
 in the left upper extremity deep venous system. No intraluminal filling defects seen. Internal jugul
ar and subclavian veins are normal as well.

 

IMPRESSION:  No DVT in the left upper extremity. Withheld/Decline to Answer

## 2025-02-23 ENCOUNTER — HOSPITAL ENCOUNTER (EMERGENCY)
Dept: HOSPITAL 97 - ER | Age: 55
Discharge: HOME | End: 2025-02-23
Payer: COMMERCIAL

## 2025-02-23 VITALS — TEMPERATURE: 98.1 F | OXYGEN SATURATION: 96 % | SYSTOLIC BLOOD PRESSURE: 160 MMHG | DIASTOLIC BLOOD PRESSURE: 91 MMHG

## 2025-02-23 DIAGNOSIS — N13.2: Primary | ICD-10-CM

## 2025-02-23 DIAGNOSIS — Z87.442: ICD-10-CM

## 2025-02-23 LAB
ALBUMIN SERPL BCP-MCNC: 3.6 G/DL (ref 3.4–5)
ALBUMIN/GLOB SERPL: 0.9 {RATIO} (ref 1.1–1.8)
ALP SERPL-CCNC: 83 U/L (ref 45–117)
ALT SERPL W P-5'-P-CCNC: 28 U/L (ref 16–61)
ANION GAP SERPL CALC-SCNC: 10.4 MEQ/L (ref 5–15)
AST SERPL W P-5'-P-CCNC: 13 U/L (ref 15–37)
BLD SMEAR INTERP: (no result)
BUN BLD-MCNC: 20 MG/DL (ref 7–18)
GLOBULIN SER CALC-MCNC: 3.9 G/DL (ref 2.3–3.5)
GLUCOSE SERPLBLD-MCNC: 140 MG/DL (ref 74–106)
HCT VFR BLD CALC: 49.2 % (ref 39.6–49)
HGB BLD-MCNC: 15.9 G/DL (ref 13.6–17.9)
LIPASE SERPL-CCNC: 40 U/L (ref 13–75)
LYMPHOCYTES # SPEC AUTO: 1.2 K/UL (ref 0.7–4.9)
MCH RBC QN AUTO: 27.6 PG (ref 27–35)
MCHC RBC AUTO-ENTMCNC: 32.3 G/DL (ref 32–36)
MCV RBC: 85.3 FL (ref 80–100)
MORPHOLOGY BLD-IMP: (no result)
NRBC # BLD: 0 10*3/UL (ref 0–0)
NRBC BLD AUTO-RTO: 0.2 % (ref 0–0)
PMV BLD: 9.1 FL (ref 7.6–11.3)
POTASSIUM SERPL-SCNC: 3.4 MEQ/L (ref 3.5–5.1)
RBC # BLD: 5.77 M/UL (ref 4.33–5.43)
WBC # BLD AUTO: 12.9 THOU/UL (ref 4.3–10.9)

## 2025-02-23 PROCEDURE — 80053 COMPREHEN METABOLIC PANEL: CPT

## 2025-02-23 PROCEDURE — 74176 CT ABD & PELVIS W/O CONTRAST: CPT

## 2025-02-23 PROCEDURE — 96374 THER/PROPH/DIAG INJ IV PUSH: CPT

## 2025-02-23 PROCEDURE — 83690 ASSAY OF LIPASE: CPT

## 2025-02-23 PROCEDURE — 99284 EMERGENCY DEPT VISIT MOD MDM: CPT

## 2025-02-23 PROCEDURE — 76377 3D RENDER W/INTRP POSTPROCES: CPT

## 2025-02-23 PROCEDURE — 96361 HYDRATE IV INFUSION ADD-ON: CPT

## 2025-02-23 PROCEDURE — 96375 TX/PRO/DX INJ NEW DRUG ADDON: CPT

## 2025-02-23 PROCEDURE — 36415 COLL VENOUS BLD VENIPUNCTURE: CPT

## 2025-02-23 PROCEDURE — 85025 COMPLETE CBC W/AUTO DIFF WBC: CPT

## 2025-02-23 NOTE — ER
Nurse's Notes                                                                                     

 [facility]                                                                 

Name: Zhen Santos                                                                               

Age: 54 yrs                                                                                       

Sex: Male                                                                                         

: 1970                                                                                   

MRN: [id number]                                                                                   

Arrival Date: 2025                                                                          

Time: 06:59                                                                                       

Account#: [id number]                                                                            

Bed 8                                                                                             

Private MD:                                                                                       

Diagnosis: Hydronephrosis with renal and ureteral calculous obstruction-5 mm distal right         

                                                                                                  

Presentation:                                                                                     

                                                                                             

07:11 Chief complaint: Patient states: Left flank pain. Coronavirus screen: At this time, the [facility] 

      client does not indicate any symptoms associated with coronavirus-19. Ebola Screen: No      

      symptoms or risks identified at this time. Initial Sepsis Screen: Does the patient meet     

      any 2 criteria? No. Patient's initial sepsis screen is negative. Does the patient have      

      a suspected source of infection? No. Patient's initial sepsis screen is negative. Risk      

      Assessment: Do you want to hurt yourself or someone else? Patient reports no desire to      

      harm self or others. Onset of symptoms was 2025 at 02:00.                      

07:11 Method Of Arrival: Ambulatory                                                           [facility] 

07:11 Acuity: THO 3                                                                           jl7 

                                                                                                  

Triage Assessment:                                                                                

07:15 General: Appears in no apparent distress. uncomfortable, Behavior is calm, cooperative, jl7 

      appropriate for age. Pain: Complains of pain in left flank Pain currently is 5 out of       

      10 on a pain scale. at worst was 8 out of 10 on a pain scale. Cardiovascular: Patient's     

      skin is warm and dry. Respiratory: Airway is patent Respiratory effort is even,             

      unlabored, Respiratory pattern is regular, symmetrical. GI: Abdomen is non-distended.       

      Derm: Skin is pink, warm \T\ dry.                                                           

                                                                                                  

Historical:                                                                                       

- Allergies:                                                                                      

07:13 Dilantin;                                                                               jl7 

- PMHx:                                                                                           

07:13 Hypertension; Kidney stones; prostate issues; Seizures;                                 jl7 

                                                                                                  

- Immunization history:: Adult Immunizations unknown.                                             

- Infectious Disease History:: Denies.                                                            

- Social history:: Smoking status: Patient denies any tobacco usage or history of.                

                                                                                                  

                                                                                                  

Screenin:15 [facility] ED Fall Risk Assessment (Adult) History of falling in the last 3 months,       jl7 

      including since admission No falls in past 3 months (0 pts) Confusion or Disorientation     

      No (0 pts) Intoxicated or Sedated No (0 pts) Impaired Gait No (0 pts) Mobility Assist       

      Device Used No (0 pt) Altered Elimination No (0 pt) Score/Fall Risk Level 0 - 2 = Low       

      Risk Oriented to surroundings, Maintained a safe environment. Abuse screen: Denies          

      threats or abuse. Denies injuries from another. Nutritional screening: No deficits          

      noted. Tuberculosis screening: No symptoms or risk factors identified.                      

                                                                                                  

Assessment:                                                                                       

08:00 Reassessment: Patient appears in no apparent distress at this time. Patient and/or      jl7 

      family updated on plan of care and expected duration. Pain level reassessed. Patient is     

      alert, oriented x 3, equal unlabored respirations, skin warm/dry/pink.                      

                                                                                                  

Vital Signs:                                                                                      

07:11  / 91; Pulse 77; Resp 15; Temp 98.1; Pulse Ox 96% ; Weight 123.83 kg; Height 6    jl7 

      ft. 0 in. ; Pain 8/10;                                                                      

07:45 Pain 3/10;                                                                              jl7 

09:00  / 83; Pulse 67; Resp 17; Pulse Ox 96% ; Pain 3/10;                               jl7 

07:11 Body Mass Index 37.03 (123.83 kg, 182.88 cm)                                            jl7 

07:11 Pain Scale: Adult                                                                       jl7 

07:45 Pain Scale: Adult                                                                       jl7 

09:00 Pain Scale: Adult                                                                       jl7 

                                                                                                  

ED Course:                                                                                        

07:01 Patient arrived in ED.                                                                  mr  

07:03 Ray Eduardo MD is Attending Physician.                                             radha 

07:06 Salvatore Valencia, RN is Primary Nurse.                                                      jl7 

07:13 Triage completed.                                                                       jl7 

07:15 Arm band placed on right wrist.                                                         jl7 

07:15 Patient has correct armband on for positive identification. Provided Education on: use  jl7 

      of call bell.                                                                               

07:15 Initial lab(s) drawn, by me, sent to lab. Urine collected: clean catch specimen, clear. jl7 

      Inserted saline lock: 20 gauge in right antecubital area, using aseptic technique.          

      Blood collected. Flushed with 10 mL NS.                                                     

07:49 CT Stone Protocol In Process Unspecified.                                               EDMS

08:24 Peter Bone MD is Referral Physician.                                              radha 

09:05 No provider procedures requiring assistance completed. IV discontinued, intact,         jl7 

      bleeding controlled, No redness/swelling at site. Pressure dressing applied.                

                                                                                                  

Administered Medications:                                                                         

07:17 Drug: Ondansetron IVP 4 mg IVP once; over 2 minutes Route: IVP; Site: right antecubital;ld1 

08:59 Follow up: Response: No adverse reaction                                                jl7 

07:17 Drug: NS 0.9% IV 1000 ml IV at 1 bolus Per protocol; to be given as a bolus over 60     ld1 

      minutes Route: IV; Rate: 1 bolus; Site: right antecubital;                                  

08:59 Follow up: Response: No adverse reaction; IV Status: Completed infusion; IV Intake:     jl7 

      1000ml                                                                                      

07:18 Drug: Ketorolac IVP 30 mg IVP once Route: IVP; Site: right antecubital;                 ld1 

07:45 Follow up: Pain 3/10 Adult; Response: No adverse reaction; Pain is decreased; RASS:     jl7 

      Alert and Calm (0)                                                                          

08:50 Drug: Rocephin IV 1 grams IV at per protocol once; Given slow IV push per pharmacy      jl7 

      instructions Route: IV; Rate: per protocol; Site: right antecubital;                        

09:00 Follow up: Response: No adverse reaction; IV Status: Completed infusion                 jl7 

09:04 Not Given (Physician Discretion): morphineor iv 4 mg IVP once over 4 mins               jl7 

09:04 Not Given (Physician Discretion): ns 0.9% 1000 ml IV at 1000 ml once; to be given as a  jl7 

      bolus over 60 minutes                                                                       

                                                                                                  

                                                                                                  

Medication:                                                                                       

08:00 VIS not applicable for this client.                                                     jl7 

                                                                                                  

Intake:                                                                                           

08:59 IV: 1000ml; Total: 1000ml.                                                              jl7 

                                                                                                  

Outcome:                                                                                          

08:24 Discharge ordered by MD.                                                                radha 

09:06 Discharged to home ambulatory,                                                          jl 

09:06 Condition: stable                                                                           

09:06 Discharge instructions given to patient, Instructed on discharge instructions, follow       

      up and referral plans. medication usage, Demonstrated understanding of instructions,        

      follow-up care, medications, Prescriptions given X 4,                                       

09:06 Patient left the ED.                                                                    jl7 

                                                                                                  

Signatures:                                                                                       

Dispatcher [employer]                           EDMS                                                 

Ray Eduardo MD MD cha Rivera, Mary, Reg                       Reg  Salvatore Aparicio RN                        RN   jl7                                                  

Etta Paz RN                        RN   ld1                                                  

                                                                                                  

**************************************************************************************************

## 2025-02-23 NOTE — RAD REPORT
EXAMINATION: Stone Protocol



CLINICAL INDICATION: Abdominal pain. Left flank pain



TECHNIQUE: CT abdomen and pelvis was performed, without IV contrast, as per department protocol. Oral
 contrast not given. Axial, sagittal and coronal reconstructions were obtained. One or more of the

following dose reduction techniques were used: Automated exposure control, adjustment of the mA and k
V according to the patient size, and iterative reconstruction. Unless otherwise specified,

incidental findings do not require dedicated imaging follow-up. 



COMPARISON: 2014.



FINDINGS:  



The lack of intravenous and oral contrast limits the sensitivity of this exam for evaluation of solid
 visceral organs, vascular structures, and bowel



Multiple, bilateral renal calculi. Mild to moderate left hydronephrosis 5 mm calculus distal left ure
ter.



No right hydronephrosis



Liver, spleen, pancreas and adrenals grossly normal



No evidence of diverticulitis. Normal appendix



Umbilical hernia repair.. 3.5 cm fatty adjacent to the left anterior abdominal wall at the site of um
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



Small-to-moderate left and small right inguinal hernias containing fat.



Patel rods united by pedicular screws have been placed L3-S1. Bone plugs are present. Lucencies 
involves large portions of L3, L4 and L5 vertebral bodies having the appearance of osteomyelitis

is. The left screw protrudes through the cortex of the L5 vertebral body.



Mild posterior subluxation L2 on L3. Mild compression deformity T10 vertebral body presumably chronic




IMPRESSION: 



5 mm calculus distal right ureter results in mild to moderate left hydronephrosis.



Bilateral renal calculi



Osteolysis L3, L4 and L5 vertebral bodies.



Reported By: Maynor Knight 

Electronically Signed:  2/23/2025 8:09 AM

## 2025-02-23 NOTE — EDPHYS
Physician Documentation                                                                           

 [facility]                                                                 

Name: Zhen Santos                                                                               

Age: 54 yrs                                                                                       

Sex: Male                                                                                         

: 1970                                                                                   

MRN: [id number]                                                                                   

Arrival Date: 2025                                                                          

Time: 06:59                                                                                       

Account#: [id number]                                                                            

Bed 8                                                                                             

Private MD:                                                                                       

ED Physician Ray Eduardo                                                                      

HPI:                                                                                              

                                                                                             

08:20 This 54 yrs old  Male presents to ER via Ambulatory with complaints of Possible radha 

      Kidney Stone.                                                                               

08:20 The patient complains of pain in the right mid back and right low back. The pain        radha 

      radiates to the right mid back. Onset: The symptoms/episode began/occurred just prior       

      to arrival, this morning. Modifying factors: The symptoms are alleviated by nothing.        

      the symptoms are aggravated by nothing. Associated signs and symptoms: Pertinent            

      positives: nausea. Severity of pain: At its worst the pain was severe in the emergency      

      department the pain has improved moderately. The patient has experienced similar            

      episodes in the past.                                                                       

                                                                                                  

Historical:                                                                                       

- Allergies:                                                                                      

07:13 Dilantin;                                                                               jl7 

- PMHx:                                                                                           

07:13 Hypertension; Kidney stones; prostate issues; Seizures;                                 jl7 

                                                                                                  

- Immunization history:: Adult Immunizations unknown.                                             

- Infectious Disease History:: Denies.                                                            

- Social history:: Smoking status: Patient denies any tobacco usage or history of.                

                                                                                                  

                                                                                                  

ROS:                                                                                              

08:21 Constitutional: Negative for fever, chills, and weight loss, Eyes: Negative for injury, radha 

      pain, redness, and discharge, ENT: Negative for injury, pain, and discharge, Neck:          

      Negative for injury, pain, and swelling, Cardiovascular: Negative for chest pain,           

      palpitations, and edema, Respiratory: Negative for shortness of breath, cough,              

      wheezing, and pleuritic chest pain, Abdomen/GI: Negative for abdominal pain, nausea,        

      vomiting, diarrhea, and constipation, MS/Extremity: Negative for injury and deformity,      

      Skin: Negative for injury, rash, and discoloration, Neuro: Negative for headache,           

      weakness, numbness, tingling, and seizure, Psych: Negative for depression, anxiety,         

      suicide ideation, homicidal ideation, and hallucinations, Allergy/Immunology: Negative      

      for hives, rash, and allergies, Endocrine: Negative for neck swelling, polydipsia,          

      polyuria, polyphagia, and marked weight changes, Hematologic/Lymphatic: Negative for        

      swollen nodes, abnormal bleeding, and unusual bruising,                                     

08:21 Back: Positive for pain with movement, radiated pain,                                       

08:21 : Positive for hematuria,                                                                 

                                                                                                  

Exam:                                                                                             

08:21 Constitutional:  This is a well developed, well nourished patient who is awake, alert,  radha 

      and in no acute distress. Head/Face:  Normocephalic, atraumatic. Eyes:  Pupils equal        

      round and reactive to light, extra-ocular motions intact.  Lids and lashes normal.          

      Conjunctiva and sclera are non-icteric and not injected.  Cornea within normal limits.      

      Periorbital areas with no swelling, redness, or edema. ENT:  Nares patent. No nasal         

      discharge, no septal abnormalities noted.  Tympanic membranes are normal and external       

      auditory canals are clear.  Oropharynx with no redness, swelling, or masses, exudates,      

      or evidence of obstruction, uvula midline.  Mucous membranes moist. Neck:  Trachea          

      midline, no thyromegaly or masses palpated, and no cervical lymphadenopathy.  Supple,       

      full range of motion without nuchal rigidity, or vertebral point tenderness.  No            

      Meningismus. Chest/axilla:  Normal chest wall appearance and motion.  Nontender with no     

      deformity.  No lesions are appreciated. Cardiovascular:  Regular rate and rhythm with a     

      normal S1 and S2.  No gallops, murmurs, or rubs.  Normal PMI, no JVD.  No pulse             

      deficits. Respiratory:  Lungs have equal breath sounds bilaterally, clear to                

      auscultation and percussion.  No rales, rhonchi or wheezes noted.  No increased work of     

      breathing, no retractions or nasal flaring. Abdomen/GI:  Soft, non-tender, with normal      

      bowel sounds.  No distension or tympany.  No guarding or rebound.  No evidence of           

      tenderness throughout. Male :  Normal genitalia with no discharge or lesions. Skin:       

      Warm, dry with normal turgor.  Normal color with no rashes, no lesions, and no evidence     

      of cellulitis. MS/ Extremity:  Pulses equal, no cyanosis.  Neurovascular intact.  Full,     

      normal range of motion., bilateral aka Neuro:  Awake and alert, GCS 15, oriented to         

      person, place, time, and situation.  Cranial nerves II-XII grossly intact.  Motor           

      strength 5/5 in all extremities.  Sensory grossly intact.  Cerebellar exam normal.          

      Normal gait. Psych:  Awake, alert, with orientation to person, place and time.              

      Behavior, mood, and affect are within normal limits.                                        

08:21 Back: pain, that is moderate, ROM is normal, normal spinal alignment noted, CVA             

      tenderness, that is mild, is noted on the right, vertebral tenderness, is not               

      appreciated, muscle spasm, is not present,                                                  

                                                                                                  

Vital Signs:                                                                                      

07:11  / 91; Pulse 77; Resp 15; Temp 98.1; Pulse Ox 96% ; Weight 123.83 kg; Height 6    jl7 

      ft. 0 in. ; Pain 8/10;                                                                      

07:45 Pain 3/10;                                                                              jl7 

09:00  / 83; Pulse 67; Resp 17; Pulse Ox 96% ; Pain 3/10;                               jl7 

07:11 Body Mass Index 37.03 (123.83 kg, 182.88 cm)                                            jl7 

07:11 Pain Scale: Adult                                                                       jl7 

07:45 Pain Scale: Adult                                                                       jl7 

09:00 Pain Scale: Adult                                                                       jl7 

                                                                                                  

MDM:                                                                                              

07:03 Medical Screening Exam initiated                                                        radha 

08:22 Differential diagnosis: nephrolithiasis, pyelonephritis, UTI, diverticulitis,           radha 

      pancreatitis. Data reviewed: vital signs, nurses notes, lab test result(s), radiologic      

      studies, CT scan. Consideration of Admission/Observation Escalation of care including       

      admission/observation considered. I considered the following discharge prescriptions or     

      medication management in the emergency department Medications were administered in the      

      Emergency Department. See MAR. Independent interpretation of the following test(s) in       

      the Emergency Department CT Scan: My interpretation is ct stone. Test considered but        

      Not performed: Ultrasound no renal usg. Care significantly affected by the following        

      chronic conditions: Hypertension, seizure, kidney stones, seizures. Counseling: I had a     

      detailed discussion with the patient and/or guardian regarding the historical points,       

      exam findings, and any diagnostic results supporting the discharge/admit diagnosis, lab     

      results, radiology results, the need for outpatient follow up, for definitive care, a       

      family practitioner, a urologist.                                                           

                                                                                                  

                                                                                             

07:09 Order name: CBC with Diff                                                               [facility] 

                                                                                             

07:09 Order name: CMP; Complete Time: 08:18                                                   [facility] 

                                                                                             

07:09 Order name: Lipase; Complete Time: 08:18                                                [facility] 

                                                                                             

07:09 Order name: CT Stone Protocol; Complete Time: 08:18                                     [facility] 

                                                                                             

07:09 Order name: IV Saline Lock; Complete Time: 07:18                                        [facility] 

                                                                                             

07:09 Order name: Labs collected and sent; Complete Time: 07:18                               [facility] 

                                                                                                  

Administered Medications:                                                                         

07:17 Drug: Ondansetron IVP 4 mg IVP once; over 2 minutes Route: IVP; Site: right antecubital;ld1 

08:59 Follow up: Response: No adverse reaction                                                jl7 

07:17 Drug: NS 0.9% IV 1000 ml IV at 1 bolus Per protocol; to be given as a bolus over 60     ld1 

      minutes Route: IV; Rate: 1 bolus; Site: right antecubital;                                  

08:59 Follow up: Response: No adverse reaction; IV Status: Completed infusion; IV Intake:     jl7 

      1000ml                                                                                      

07:18 Drug: Ketorolac IVP 30 mg IVP once Route: IVP; Site: right antecubital;                 ld1 

07:45 Follow up: Pain 3/10 Adult; Response: No adverse reaction; Pain is decreased; RASS:     jl7 

      Alert and Calm (0)                                                                          

08:50 Drug: Rocephin IV 1 grams IV at per protocol once; Given slow IV push per pharmacy      jl7 

      instructions Route: IV; Rate: per protocol; Site: right antecubital;                        

09:00 Follow up: Response: No adverse reaction; IV Status: Completed infusion                 jl7 

09:04 Not Given (Physician Discretion): morphineor iv 4 mg IVP once over 4 mins               jl7 

09:04 Not Given (Physician Discretion): ns 0.9% 1000 ml IV at 1000 ml once; to be given as a  jl7 

      bolus over 60 minutes                                                                       

                                                                                                  

                                                                                                  

Disposition Summary:                                                                              

25 08:24                                                                                    

Discharge Ordered                                                                                 

 Notes:       Location: Home                                                                        
  radha

      Problem: new                                                                            radha 

      Symptoms: have improved                                                                 radha 

      Condition: Stable                                                                       radha 

      Diagnosis                                                                                   

        - Hydronephrosis with renal and ureteral calculous obstruction - 5 mm distal right    radha 

      Followup:                                                                               radha 

        - With: Private Physician                                                                  

        - When: 2 - 3 days                                                                         

        - Reason: Recheck today's complaints, Continuance of care, Re-evaluation by your           

      physician                                                                                   

      Followup:                                                                               radha 

        - With: Peter Bone MD                                                                

        - When: 2 - 3 days                                                                         

        - Reason: Recheck today's complaints, Re-evaluation by your physician                      

      Discharge Instructions:                                                                     

        - Discharge Summary Sheet                                                             radha 

        - Kidney Stones                                                                       radha 

        - Kidney Stones, Easy-to-Read                                                         radha 

        - Hydronephrosis                                                                      radha 

        - Dietary Guidelines to Help Prevent Kidney Stones                                    radha 

      Forms:                                                                                      

        - Medication Reconciliation Form                                                      radha 

        - Antibiotic Education                                                                radha 

        - Prescription Opioid Use                                                             radha 

        - Patient Portal Instructions                                                         radha 

        - Leadership Thank You Letter                                                         radha 

        - Work release form                                                                   eb  

      Prescriptions:                                                                              

        - Flomax 0.4 mg Oral capsule                                                               

            - take 1 capsule ORAL route once; 30 capsule; Refills: 0, Product Selection       [facility] 

      Permitted                                                                                   

        - ketorolac 10 mg Oral tablet                                                              

            - take 1 tablet ORAL route every 6-8 hours for 3 days; 15 tablet; Refills: 0,     [facility] 

      Product Selection Permitted                                                                 

        - ondansetron 4 mg Oral Tablet,disintegrating                                              

            - take 1 tablet ORAL route every 6-8 hours for 5 days; 24 tablet; Refills: 0,     [facility] 

      Product Selection Permitted                                                                 

        - Cipro 500 mg Oral Tablet                                                                 

            - take 1 tablet ORAL route every 12 hours for 7 days; 14 tablet; Refills: 0,      [facility] 

      Product Selection Permitted                                                                 

Signatures:                                                                                       

Dispatcher [employer]                           EDMS                                                 

Ray Eduardo MD MD cha Leal, Jahala RN                        RN   jl7                                                  

Etta Paz RN                        RN   ld1                                                  

                                                                                                  

Corrections: (The following items were deleted from the chart)                                    

07:09 07:09 CBC+H.LAB.BRZ ordered. EDMS                                                       EDMS

07:09 07:09 COMPREHENSIVE METABOLIC PANEL+C.LAB.BRZ ordered. EDMS                             EDMS

07:09 07:09 LIPASE+C.LAB.BRZ ordered. EDMS                                                    EDMS

07:09 07:09 Urinalysis+U.LAB.BRZ ordered. EDMS                                                EDMS

                                                                                                  

**************************************************************************************************